# Patient Record
Sex: MALE | Race: BLACK OR AFRICAN AMERICAN | NOT HISPANIC OR LATINO | Employment: STUDENT | URBAN - METROPOLITAN AREA
[De-identification: names, ages, dates, MRNs, and addresses within clinical notes are randomized per-mention and may not be internally consistent; named-entity substitution may affect disease eponyms.]

---

## 2021-10-27 ENCOUNTER — APPOINTMENT (OUTPATIENT)
Dept: URGENT CARE | Facility: CLINIC | Age: 21
End: 2021-10-27

## 2021-10-27 DIAGNOSIS — Z02.1 ENCOUNTER FOR PRE-EMPLOYMENT EXAMINATION: ICD-10-CM

## 2021-10-27 PROCEDURE — 86735 MUMPS ANTIBODY: CPT

## 2021-10-27 PROCEDURE — 86787 VARICELLA-ZOSTER ANTIBODY: CPT

## 2021-10-27 PROCEDURE — 86762 RUBELLA ANTIBODY: CPT

## 2021-10-27 PROCEDURE — 86765 RUBEOLA ANTIBODY: CPT

## 2021-10-27 PROCEDURE — 86480 TB TEST CELL IMMUN MEASURE: CPT

## 2021-10-28 LAB — RUBV IGG SERPL IA-ACNC: 94.5 IU/ML

## 2021-10-29 LAB
GAMMA INTERFERON BACKGROUND BLD IA-ACNC: 0.03 IU/ML
M TB IFN-G BLD-IMP: NEGATIVE
M TB IFN-G CD4+ BCKGRND COR BLD-ACNC: 0.01 IU/ML
M TB IFN-G CD4+ BCKGRND COR BLD-ACNC: 0.01 IU/ML
MEV IGG SER QL: NORMAL
MITOGEN IGNF BCKGRD COR BLD-ACNC: >10 IU/ML
MUV IGG SER QL: NORMAL
VZV IGG SER IA-ACNC: NORMAL

## 2023-02-27 ENCOUNTER — APPOINTMENT (OUTPATIENT)
Dept: LAB | Facility: MEDICAL CENTER | Age: 23
End: 2023-02-27

## 2024-09-10 ENCOUNTER — OFFICE VISIT (OUTPATIENT)
Dept: OTOLARYNGOLOGY | Facility: CLINIC | Age: 24
End: 2024-09-10
Payer: COMMERCIAL

## 2024-09-10 VITALS
WEIGHT: 175 LBS | HEIGHT: 77 IN | OXYGEN SATURATION: 99 % | TEMPERATURE: 97.4 F | HEART RATE: 103 BPM | BODY MASS INDEX: 20.66 KG/M2

## 2024-09-10 DIAGNOSIS — H69.93 ETD (EUSTACHIAN TUBE DYSFUNCTION), BILATERAL: Primary | ICD-10-CM

## 2024-09-10 DIAGNOSIS — H61.21 RIGHT EAR IMPACTED CERUMEN: ICD-10-CM

## 2024-09-10 DIAGNOSIS — J01.81 OTHER ACUTE RECURRENT SINUSITIS: ICD-10-CM

## 2024-09-10 PROCEDURE — 99204 OFFICE O/P NEW MOD 45 MIN: CPT | Performed by: NURSE PRACTITIONER

## 2024-09-10 PROCEDURE — 69210 REMOVE IMPACTED EAR WAX UNI: CPT | Performed by: NURSE PRACTITIONER

## 2024-09-10 RX ORDER — FLUTICASONE PROPIONATE 50 MCG
1 SPRAY, SUSPENSION (ML) NASAL 2 TIMES DAILY
Qty: 1 G | Refills: 6 | Status: SHIPPED | OUTPATIENT
Start: 2024-09-10

## 2024-09-10 RX ORDER — PREDNISONE 10 MG/1
TABLET ORAL
Qty: 30 TABLET | Refills: 0 | Status: SHIPPED | OUTPATIENT
Start: 2024-09-10

## 2024-09-10 RX ORDER — AZELASTINE 1 MG/ML
1 SPRAY, METERED NASAL 2 TIMES DAILY
Qty: 1 ML | Refills: 2 | Status: SHIPPED | OUTPATIENT
Start: 2024-09-10

## 2024-09-10 NOTE — PATIENT INSTRUCTIONS
Fluticasone and Astelin nasal sprays - one spray each nostril twice day for 6 weeks    Prednisone taper for 12 day - once finish then start Allegra daily for 4 weeks

## 2024-09-10 NOTE — PROGRESS NOTES
Ambulatory Visit  Name: Sajan Guillory      : 2000      MRN: [id number]  Encounter Provider: GUSTAVO Mustafa  Encounter Date: 9/10/2024   Encounter department: [facility]    Assessment & Plan  ETD (Eustachian tube dysfunction), bilateral    Orders:    predniSONE 10 mg tablet; 4 tab for 3 days then 3 tab for 3 days then two tab for 3 days then one tab for 3 days    fluticasone (FLONASE) 50 mcg/act nasal spray; 1 spray into each nostril 2 (two) times a day    azelastine (ASTELIN) 0.1 % nasal spray; 1 spray into each nostril 2 (two) times a day Use in each nostril as directed    Other acute recurrent sinusitis    Orders:    predniSONE 10 mg tablet; 4 tab for 3 days then 3 tab for 3 days then two tab for 3 days then one tab for 3 days    fluticasone (FLONASE) 50 mcg/act nasal spray; 1 spray into each nostril 2 (two) times a day    azelastine (ASTELIN) 0.1 % nasal spray; 1 spray into each nostril 2 (two) times a day Use in each nostril as directed      Symptoms include right ear blocked for past 4 weeks.   Previous treatment includes oral antibiotics.   On exam, small cerumen impaction on left (removed with suction #5), Left TM intact, no evidence otitis media. Right cerumen impaction, removed with irrigation and suction #5, Right TM intact, slight retraction.Notable nasal congestion, turbinate hypertrophy.     Offered Audiogram today but agree to hold for now. Most likely symptoms related to residual ETD s/p URI    Discussed may take up to 3 months for otitis media in adults to resolve on its own.  Reviewed options for otitis media in adult including resume nasal steroids, oral steroids, decongestants, vs in office pe tube.     Fluticasone nasal spray one spray each nostril twice per day  Astelin nasal spray one spray each nostril twice per day  Prednisone tapered dosing, Once complete then start Claritin or Allegra one tablet daily for 4 weeks  Follow up in about 6 weeks, possible audio at that  "time  Right ear impacted cerumen           History of Present Illness     Sajan Guillory is a 24 y.o. male who presents today as a new patient due to ear concerns.  Worse about month ago. Hearing gradually worsening. Muffled hearing, slowly improving.  Bilateral ears feel blocked.  Two ear infections over past year.  No tinnitus.  No otalgia or otorrhea.  No history of ear surgery.  No current hearing aids.  Treated at urgent care prescribed oral antibiotics.       History obtained from : patient  Review of Systems   Constitutional: Negative.    HENT:  Negative for congestion, ear discharge, ear pain, hearing loss, nosebleeds, postnasal drip, rhinorrhea, sinus pressure, sinus pain, sore throat, tinnitus and voice change.    Respiratory:  Negative for chest tightness and shortness of breath.    Skin:  Negative for color change.   Neurological:  Negative for dizziness, numbness and headaches.   Psychiatric/Behavioral: Negative.       Medical History Reviewed by provider this encounter:           Objective     Pulse 103   Temp (!) 97.4 °F (36.3 °C) (Temporal)   Ht 6' 5\" (1.956 m)   Wt 79.4 kg (175 lb)   SpO2 99%   BMI 20.75 kg/m²     Physical Exam  Vitals and nursing note reviewed.   Constitutional:       General: He is not in acute distress.     Appearance: He is well-developed.   HENT:      Head: Normocephalic and atraumatic.      Right Ear: Tympanic membrane, ear canal and external ear normal. There is impacted cerumen.      Left Ear: Tympanic membrane, ear canal and external ear normal.      Nose: Nose normal.      Mouth/Throat:      Mouth: Mucous membranes are moist.   Cardiovascular:      Rate and Rhythm: Normal rate and regular rhythm.   Pulmonary:      Effort: Pulmonary effort is normal.   Musculoskeletal:      Cervical back: Neck supple.   Skin:     General: Skin is warm and dry.      Capillary Refill: Capillary refill takes less than 2 seconds.   Neurological:      Mental Status: He is alert. "   Psychiatric:         Mood and Affect: Mood normal.       Administrative Statements       Ear cerumen removal    Date/Time: 9/10/2024 11:00 AM    Performed by: GUSTAVO Mustafa  Authorized by: GUSTAVO Mustafa  Universal Protocol:  procedure performed by consultantConsent: Verbal consent obtained.  Risks and benefits: risks, benefits and alternatives were discussed  Consent given by: patient  Patient understanding: patient states understanding of the procedure being performed    Patient location:  Clinic  Procedure details:     Local anesthetic:  None    Location:  R ear    Approach:  External  Post-procedure details:     Complication:  None    Hearing quality:  Normal    Patient tolerance of procedure:  Tolerated well, no immediate complications

## 2024-10-03 DIAGNOSIS — H69.93 ETD (EUSTACHIAN TUBE DYSFUNCTION), BILATERAL: ICD-10-CM

## 2024-10-03 DIAGNOSIS — J01.81 OTHER ACUTE RECURRENT SINUSITIS: ICD-10-CM

## 2024-10-03 RX ORDER — FLUTICASONE PROPIONATE 50 MCG
SPRAY, SUSPENSION (ML) NASAL
Qty: 48 ML | Refills: 1 | Status: SHIPPED | OUTPATIENT
Start: 2024-10-03

## 2025-05-06 ENCOUNTER — OFFICE VISIT (OUTPATIENT)
Dept: DERMATOLOGY | Facility: CLINIC | Age: 25
End: 2025-05-06
Payer: COMMERCIAL

## 2025-05-06 DIAGNOSIS — L21.9 SEBORRHEIC DERMATITIS: Primary | ICD-10-CM

## 2025-05-06 PROCEDURE — 99204 OFFICE O/P NEW MOD 45 MIN: CPT | Performed by: DERMATOLOGY

## 2025-05-06 RX ORDER — FLUOCINONIDE TOPICAL SOLUTION USP, 0.05% 0.5 MG/ML
SOLUTION TOPICAL
Qty: 60 ML | Refills: 5 | Status: SHIPPED | OUTPATIENT
Start: 2025-05-06

## 2025-05-06 NOTE — PROGRESS NOTES
"[facility] Note     Patient Name: Sajan Guillory  Encounter Date: 05/06/2025      Have you been cared for by a [facility] Dermatologist in the last 3 years and, if so, which description applies to you? NO. I am considered a \"new\" patient and must complete all patient intake questions. I am not of child-bearing potential.     REVIEW OF SYSTEMS:  Have you recently had or currently have any of the following? Recent fever or chills? No  Any non-healing wound? No   PAST MEDICAL HISTORY:  Have you personally ever had or currently have any of the following?  If \"YES,\" then please provide more detail. Skin cancer (such as Melanoma, Basal Cell Carcinoma, Squamous Cell Carcinoma?  No  Tuberculosis, HIV/AIDS, Hepatitis B or C: No  Radiation Treatment No   HISTORY OF IMMUNOSUPPRESSION:   Do you have a history of any of the following:  Systemic Immunosuppression such as Diabetes, Biologic or Immunotherapy, Chemotherapy, Organ Transplantation, Bone Marrow Transplantation or Prednisone?  No     Answering \"YES\" requires the addition of the dotphrase \"IMMUNOSUPPRESSED\" as the first diagnosis of the patient's visit.   FAMILY HISTORY:  Any \"first degree relatives\" (parent, brother, sister, or child) with the following?    Skin Cancer, Pancreatic or Other Cancer? No   PATIENT EXPERIENCE:    Do you want the Dermatologist to perform a COMPLETE skin exam today including a clinical examination under the \"bra and underwear\" areas?  NO  If necessary, do we have your permission to call and leave a detailed message on your Preferred Phone number that includes your specific medical information?  Yes      No Known Allergies   Current Outpatient Medications:     azelastine (ASTELIN) 0.1 % nasal spray, 1 spray into each nostril 2 (two) times a day Use in each nostril as directed, Disp: 1 mL, Rfl: 2    fluticasone (FLONASE) 50 mcg/act nasal spray, SPRAY 1 SPRAY INTO EACH NOSTRIL TWICE A DAY, Disp: 48 mL, Rfl: 1    predniSONE 10 " mg tablet, 4 tab for 3 days then 3 tab for 3 days then two tab for 3 days then one tab for 3 days, Disp: 30 tablet, Rfl: 0              Whom besides the patient is providing clinical information about today's encounter?   NO ADDITIONAL HISTORIAN (patient alone provided history)    Physical Exam and Assessment/Plan by Diagnosis:    SEBORRHEIC DERMATITIS    Physical Exam:  Anatomic Location Affected:  scalp  Morphological Description:  thick flakes   Pertinent Positives:  Pertinent Negatives:    Additional History of Present Condition:  patient has been having a itchy scalp, patient has used over the counter anti itch shampoo with little improvement. On and off x years; flaring    Assessment and Plan:  Based on a thorough discussion of this condition and the management approach to it (including a comprehensive discussion of the known risks, side effects and potential benefits of treatment), the patient (family) agrees to implement the following specific plan:  Start Flucinonide solution twice daily for two weeks than as needed   Follow up as needed      Chronic illness with exacerbation, progression or side effects of treatment         Scribe Attestation      I,:  Ping Ariza am acting as a scribe while in the presence of the attending physician.:       I,:  Tad Sepulveda MD personally performed the services described in this documentation    as scribed in my presence.: